# Patient Record
Sex: FEMALE | Race: ASIAN | NOT HISPANIC OR LATINO | Employment: OTHER | URBAN - METROPOLITAN AREA
[De-identification: names, ages, dates, MRNs, and addresses within clinical notes are randomized per-mention and may not be internally consistent; named-entity substitution may affect disease eponyms.]

---

## 2017-10-19 ENCOUNTER — LAB (OUTPATIENT)
Dept: LAB | Facility: HOSPITAL | Age: 57
End: 2017-10-19

## 2017-10-19 ENCOUNTER — OFFICE VISIT (OUTPATIENT)
Dept: GYNECOLOGIC ONCOLOGY | Facility: CLINIC | Age: 57
End: 2017-10-19

## 2017-10-19 VITALS
BODY MASS INDEX: 30.05 KG/M2 | RESPIRATION RATE: 14 BRPM | TEMPERATURE: 97 F | HEIGHT: 64 IN | OXYGEN SATURATION: 98 % | HEART RATE: 88 BPM | SYSTOLIC BLOOD PRESSURE: 146 MMHG | WEIGHT: 176 LBS | DIASTOLIC BLOOD PRESSURE: 71 MMHG

## 2017-10-19 DIAGNOSIS — E11.65 TYPE 2 DIABETES MELLITUS WITH HYPERGLYCEMIA, WITHOUT LONG-TERM CURRENT USE OF INSULIN (HCC): ICD-10-CM

## 2017-10-19 DIAGNOSIS — E66.09 CLASS 1 OBESITY DUE TO EXCESS CALORIES WITHOUT SERIOUS COMORBIDITY WITH BODY MASS INDEX (BMI) OF 30.0 TO 30.9 IN ADULT: ICD-10-CM

## 2017-10-19 DIAGNOSIS — N95.0 PMB (POSTMENOPAUSAL BLEEDING): ICD-10-CM

## 2017-10-19 DIAGNOSIS — E04.9 GOITER: Primary | ICD-10-CM

## 2017-10-19 DIAGNOSIS — Z65.9 SOCIAL PROBLEM: ICD-10-CM

## 2017-10-19 DIAGNOSIS — R93.89 ENDOMETRIAL THICKENING ON ULTRA SOUND: ICD-10-CM

## 2017-10-19 LAB
HBA1C MFR BLD: 8.1 % (ref 4.8–5.6)
TSH SERPL DL<=0.05 MIU/L-ACNC: 3.48 MIU/ML (ref 0.35–5.35)

## 2017-10-19 PROCEDURE — 84443 ASSAY THYROID STIM HORMONE: CPT | Performed by: OBSTETRICS & GYNECOLOGY

## 2017-10-19 PROCEDURE — 99244 OFF/OP CNSLTJ NEW/EST MOD 40: CPT | Performed by: OBSTETRICS & GYNECOLOGY

## 2017-10-19 PROCEDURE — 36415 COLL VENOUS BLD VENIPUNCTURE: CPT

## 2017-10-19 PROCEDURE — 83036 HEMOGLOBIN GLYCOSYLATED A1C: CPT | Performed by: OBSTETRICS & GYNECOLOGY

## 2017-10-19 NOTE — PROGRESS NOTES
Rochelle Hernandez  [id number]  1960      Reason for visit:  Fibroid uterus, PMB    Consultation:  Patient is being seen at the request of Dr. Massey for PMB, fibroid uterus    History of present illness:  The patient is a 57 y.o. year old female who presents today for treatment and evaluation of the above issues.    She notes menopause 6 years ago and denies postmenopausal bleeding until about 6 months ago when she developed spotting.    She was seen by Dr. Massey and underwent transvaginal ultrasound 2017.  Uterus was 5.3 x 6.6 x 5.2 cm with a 5.9 x 4.6 x 7.1 fibroid.  Ovaries were not seen.  Endometrial stripe was 24 mm.  There was no free fluid.     Pap smear 8/10/2017 was HR HPV and cytology negative.  That same day she underwent biopsy of a cervical polyp and pathology was benign.    Today, patient notes ongoing spotting.  She was scheduled for dilation curettage and declined.  She states that she lives alone in [facility] transportation issues for reasons not to get the D&C.  She states she is very busy trying to sell her house.  She notes that she does have friends that that they all work and she was concerned about finding transportation for procedure.  She has not had colonoscopy in the past.  Mammogram is up-to-date.  Patient has a history of thyroid dysfunction with no recent labs.  She's been on medication in the past.  She is newly established with a new primary care provider.    OBGYN History:  She is a .  She does not use HRT. She does not have a history of abnormal pap smears.      Oncologic History:   No history exists.         Past Medical History:   Diagnosis Date   • Borderline high blood pressure    • Positive TB test    • Thyroid disease        Past Surgical History:   Procedure Laterality Date   •  SECTION         MEDICATIONS: The current medication list was reviewed with the patient and updated in the EMR this date per the Medical Assistant. Medication dosages and frequencies  were confirmed to be accurate.      Allergies:  is allergic to latex.    Social History:   Social History     Social History   • Marital status:      Spouse name: N/A   • Number of children: 1   • Years of education: N/A     Occupational History   • Not on file.     Social History Main Topics   • Smoking status: Never Smoker   • Smokeless tobacco: Never Used   • Alcohol use No   • Drug use: No   • Sexual activity: Defer     Other Topics Concern   • Not on file     Social History Narrative   • No narrative on file       Family History:    Family History   Problem Relation Age of Onset   • No Known Problems Mother    • Emphysema Father    • No Known Problems Sister    • Alcohol abuse Brother    • No Known Problems Maternal Grandmother    • No Known Problems Maternal Grandfather    • No Known Problems Paternal Grandmother    • Throat cancer Paternal Grandfather      70       Health Maintenance:    Health Maintenance   Topic Date Due   • TDAP/TD VACCINES (1 - Tdap) 09/03/1980   • HEPATITIS C SCREENING  07/14/2017   • MAMMOGRAM  07/14/2017   • COLONOSCOPY  07/14/2017   • INFLUENZA VACCINE  08/01/2017   • PAP SMEAR  07/14/2017         Review of Systems   Constitutional: Negative for appetite change, chills, fatigue, fever and unexpected weight change.   HENT: Negative for congestion, hearing loss and sore throat.    Eyes: Negative for redness and visual disturbance.   Respiratory: Negative for cough, shortness of breath and wheezing.    Cardiovascular: Negative for chest pain, palpitations and leg swelling.   Gastrointestinal: Negative for abdominal distention, abdominal pain, blood in stool, constipation, diarrhea, nausea and vomiting.   Endocrine: Negative.  Negative for cold intolerance and heat intolerance.   Genitourinary: Negative for difficulty urinating, dyspareunia, dysuria, frequency, genital sores, hematuria, pelvic pain, urgency, vaginal bleeding, vaginal discharge and vaginal pain.   Musculoskeletal:  "Positive for gait problem. Negative for arthralgias, back pain and joint swelling.        Plantar fasciitis   Skin: Negative for rash and wound.   Allergic/Immunologic: Negative for food allergies and immunocompromised state.   Neurological: Negative for dizziness, seizures, syncope, weakness, light-headedness, numbness and headaches.   Hematological: Negative for adenopathy. Does not bruise/bleed easily.   Psychiatric/Behavioral: Negative.  Negative for confusion, dysphoric mood and sleep disturbance. The patient is not nervous/anxious.        Physical Exam    Vitals:    10/19/17 0937   BP: 146/71   Pulse: 88   Resp: 14   Temp: 97 °F (36.1 °C)   TempSrc: Temporal Artery    SpO2: 98%   Weight: 176 lb (79.8 kg)   Height: 64\" (162.6 cm)     Body mass index is 30.21 kg/(m^2).      GENERAL: Alert, well-appearing female appearing her stated age who is in no apparent distress.   HEENT: Sclera anicteric. Head normocephalic, atraumatic. Mucus membranes moist.   NECK: Trachea midline, supple, without masses. Goiter  BREASTS: Deferred  CARDIOVASCULAR: Normal rate, regular rhythm, no murmurs, rubs, or gallops.  No peripheral edema.  RESPIRATORY: Clear to auscultation bilaterally, normal respiratory effort  BACK:  No CVA tenderness, no vertebral tenderness on palpation  GASTROINTESTINAL:  Abdomen is soft, non-tender, non-distended, no rebound or guarding, no masses, or hernias. No HSM.   SKIN:  Warm, dry, well-perfused.  All visible areas intact.  No rashes, lesions, ulcers.  PSYCHIATRIC: AO x3, with appropriate affect, normal thought processes.  NEUROLOGIC: No focal deficits.  Moves extremities fairly well, very mild limp.  MUSCULOSKELETAL: Normal station, mild limp.  EXTREMITIES:   No cyanosis, clubbing, symmetric.  LYMPHATICS:  No cervical or inguinal adenopathy noted.     PELVIC exam:    GYNECOLOGIC:  External genitalia are free from lesion.  On speculum examination, the cervix was remarkable for a polyp at the cervical os. "  On bimanual examination no mass was appreciated.  Uterus was enlarged in size and shape. There is no cervical motion or uterine tenderness. No cervical mass was palpated. Parametria were smooth. Rectovaginal exam was deferred.  Exam was somewhat limited due to body habitus.    ECOG PS 0    PROCEDURES:  none    Diagnostic Data:    Pathology report, Pap smear report, ultrasound report, and provided notes were all reviewed  No results found for: ]      Assessment/Plan   This is a 57 y.o. woman with postmenopausal bleeding, thickened endometrial stripe, fibroid uterus.  Refused dilation and curettage with Dr. Massey.     I agree with  that patient needs a dilation curettage to exclude malignancy given postmenopausal bleeding and thickened endometrial stripe.  Cancer as a potential diagnosis was discussed with the patient.    She really needs primary care intervention.  TSH was ordered today due to goiter and history of prior medication for thyroid dysfunction.  She is hypertensive today with blood pressure in the 140s.  She is not taking medication for this and states that her blood pressures anywhere from 120s to 140s but typically runs 130s to 140s.  She was educated that she is likely hypertensive and would require medication.  She has never had a colonoscopy.  She thought typical breast cancer screening was a mammogram every 5 years.  She was educated regarding this.    Referral to social work as patient is having transportation problems for surgery.    Patient was consented for dilation curettage, hysteroscopy.      Risks and benefits of surgery were discussed.  This included, but was not limited to, infection and bleeding like when the skin is cut; damage to surrounding structures; and incisional complications.  Risk of DVT was addressed for major surgeries.  Standard of care efforts to minimize these risks were reviewed.  Typical recovery was discussed as well as post-procedure precautions.   Surgical implications of chronic illnesses on recovery and surgical outcome were reviewed.     Patient verbalized understanding of the plan including the risks and benefits.  Appropriate perioperative testing including laboratory evaluation, EKG as clinically indicated, chest x-ray as clinically indicated, and preadmission evaluation were all ordered as a part of this patient's care.    Patient was encouraged to see primary care physician to address ongoing primary care issues as noted above.    FOLLOW UP: Return for surgery.    Note: Speech recognition transcription software was used to dictate portions of this document.  An attempt at proofreading has been made though minor errors in transcription may still be present.  Please do not hesitate to call our office with any questions.      Electronically Signed by: Stephenie Davison MD  Date: 10/19/2017         Hemoglobin A1c was elevated.  Metformin was prescribed.  Patient was contacted by APRN to discuss ongoing follow-up with primary care provider of newly diagnosed diabetes.    Stephenie Davison MD  10/19/17  1:11 PM

## 2017-10-20 ENCOUNTER — TELEPHONE (OUTPATIENT)
Dept: OBSTETRICS AND GYNECOLOGY | Facility: CLINIC | Age: 57
End: 2017-10-20

## 2017-10-20 NOTE — TELEPHONE ENCOUNTER
----- Message from Stephenie Davison MD sent at 10/19/2017  4:25 PM EDT -----  Please call pt re DM as we discussed  Thanks-    ----- Message -----     From: Lab, Background User     Sent: 10/19/2017  10:14 AM       To: Stephenie Davison MD

## 2017-10-25 ENCOUNTER — TELEPHONE (OUTPATIENT)
Dept: GYNECOLOGIC ONCOLOGY | Facility: CLINIC | Age: 57
End: 2017-10-25

## 2017-10-25 NOTE — TELEPHONE ENCOUNTER
Returned pt's call regarding scheduling outpatient surgery with Dr. Davison.  Left message to call.

## 2017-10-26 NOTE — TELEPHONE ENCOUNTER
I called patient to discuss her recent lab results. Notified HgbA1C = 8.10.   We discussed what this lab means and correlation with blood sugar control. She states she has never been diagnosed with diabetes. I informed her that based upon this lab value, this is likely the case, but it should be followed up with her new PCP. Dr. Davison has sent Metformin to her pharmacy for her to begin now. We reviewed medication instructions, risks, benefits, and potential side effects.     She was encouraged to cut out sugars and simple carbohydrates from her diet. She states this may be difficult as pastas and breads are a large portion of her current diet. We discussed how diabetes can impair the body's ability to heal after surgery. It is very important that she make these diet changes and begin her new medication in anticipation of her surgery with Dr. Davison. Education also provided on long term effects of poor blood sugar control including damage to eye sight, neuropathy, etc. I encouraged Rochelle to make an appointment with her PCP to discuss her elevated labs and ongoing management. I will send them a letter to notify them as well. She v/u.

## 2017-10-31 ENCOUNTER — PREP FOR SURGERY (OUTPATIENT)
Dept: GYNECOLOGIC ONCOLOGY | Facility: CLINIC | Age: 57
End: 2017-10-31

## 2017-10-31 DIAGNOSIS — N95.0 PMB (POSTMENOPAUSAL BLEEDING): Primary | ICD-10-CM

## 2017-11-03 ENCOUNTER — PREP FOR SURGERY (OUTPATIENT)
Dept: OBSTETRICS AND GYNECOLOGY | Facility: CLINIC | Age: 57
End: 2017-11-03

## 2017-11-03 NOTE — PATIENT INSTRUCTIONS
Outpatient Pre-op Patient Education  *See checked boxes for your instructions*    Rochelle Hernandez  [id number]  1960    SURGEON: Dr. Davison    Appointment  [x]  1. Your surgery has been scheduled on 11-20-17 at [facility], [facility] located at [address]. You will need to be there at 7:00 AM.   [x] 2.  You have pre-admission testing (PAT) appointment on 11-19-17 at 3:15 PM.  You will need to be at hospital registration 10 minutes before that time.     [x] 3.  The registration department is located in the long hallway between the [address].       The Day(s) Before Surgery  [x] 1.  Do not drink alcohol or smoke.     [x] 2.  Do not take vitamins or aspirin one week before surgery.       [x] 3.  If you are ill on the days leading up to your surgery, please call our office.      [x] 4.  If you are using medications for diabetes, call the physician who manages these and get instructions on how they should be taken before and after surgery.    [x] 5.  Nothing to eat or drink after midnight on 11-19-17.      [x] 6.  Please make prior arrangements for someone to drive you home after your procedure        The Day of Surgery  [x] 1.  Do not eat, drink, or chew gum.     [x] 2.  On the morning of your surgery, you may take her prescription medications with a sip of water. Bring all medication with you to the surgery center. (Diabetic patients should bring insulin if instructed to do so by their diabetes managing physician).   [x] 3. Bathe or shower the morning of your surgery. Do not use powders, lotions, or creams. Deodorants are okay.     [x] 4. Wear loose, comfortable clothing.     [x] 5. Bring holders for glasses, contacts, or dentures.      [x] 6. Bring any required payment and forms, including insurance cards. Leave all money and valuables at home.        Post-surgery Instructions  [x] 1.  For the first 24 hours, rest and take periodic deep breaths to remove anesthetic agents from  your body.   [x] 2.  Follow any specific instructions relevant to your particular surgery.     [x] 3.  Limit activity to avoid stress to the surgical site.      [x] 4.  Keep all dressings dry. Shower or bathe as instructed by your doctor.     [x] 5.  Drink and eat light foods. Remain on liquids alone only if nausea and vomiting occur. Return to your regular diet gradually, as tolerance allows.    [x] 6. Avoid alcohol for at least 24 hours.      [x] 7.  Take prescription pain medication as directed and with food.      [x] 8.  Call our office after discharge from the surgery center to make your post-op appointment.        In Case of Emergency:  Call our office if you experience any of the following:  - Excessive drainage, bleeding, swelling, or redness at the incision site  - Severe pain not eased by pain medication  - Temperature above 101  - Persistent nausea or vomiting  - Skin rash or general body itching

## 2017-11-05 ENCOUNTER — RESULTS ENCOUNTER (OUTPATIENT)
Dept: GYNECOLOGIC ONCOLOGY | Facility: CLINIC | Age: 57
End: 2017-11-05

## 2017-11-05 DIAGNOSIS — N95.0 PMB (POSTMENOPAUSAL BLEEDING): ICD-10-CM

## 2017-11-06 ENCOUNTER — TELEPHONE (OUTPATIENT)
Dept: GYNECOLOGIC ONCOLOGY | Facility: CLINIC | Age: 57
End: 2017-11-06

## 2017-11-06 NOTE — TELEPHONE ENCOUNTER
Phoned pt per Dr. Davison's request, asked her if she had an appt. to see her PCP yet.  Pt says she has not, she has not had time.  Informed pt Dr. Davison has to have a note from her PCP before she can go into the OR.  Pt v/u, will call now to get appt., call me back to let me know it will be.   Received call from [facility] with Dr. Carrizales, states pt has an appt on 11-14-17, will copy the notes of the visit and fax to Dr. Davison.

## 2017-11-19 ENCOUNTER — APPOINTMENT (OUTPATIENT)
Dept: PREADMISSION TESTING | Facility: HOSPITAL | Age: 57
End: 2017-11-19

## 2017-11-19 DIAGNOSIS — N95.0 PMB (POSTMENOPAUSAL BLEEDING): ICD-10-CM

## 2017-11-19 LAB
ABO GROUP BLD: NORMAL
ALBUMIN SERPL-MCNC: 4.5 G/DL (ref 3.2–4.8)
ALBUMIN/GLOB SERPL: 1.6 G/DL (ref 1.5–2.5)
ALP SERPL-CCNC: 90 U/L (ref 25–100)
ALT SERPL W P-5'-P-CCNC: 36 U/L (ref 7–40)
ANION GAP SERPL CALCULATED.3IONS-SCNC: 7 MMOL/L (ref 3–11)
AST SERPL-CCNC: 25 U/L (ref 0–33)
BASOPHILS # BLD AUTO: 0.02 10*3/MM3 (ref 0–0.2)
BASOPHILS NFR BLD AUTO: 0.2 % (ref 0–1)
BILIRUB SERPL-MCNC: 0.4 MG/DL (ref 0.3–1.2)
BLD GP AB SCN SERPL QL: NEGATIVE
BUN BLD-MCNC: 22 MG/DL (ref 9–23)
BUN/CREAT SERPL: 27.5 (ref 7–25)
CALCIUM SPEC-SCNC: 9.6 MG/DL (ref 8.7–10.4)
CHLORIDE SERPL-SCNC: 104 MMOL/L (ref 99–109)
CO2 SERPL-SCNC: 30 MMOL/L (ref 20–31)
CREAT BLD-MCNC: 0.8 MG/DL (ref 0.6–1.3)
DEPRECATED RDW RBC AUTO: 35.9 FL (ref 37–54)
EOSINOPHIL # BLD AUTO: 0.06 10*3/MM3 (ref 0–0.3)
EOSINOPHIL NFR BLD AUTO: 0.7 % (ref 0–3)
ERYTHROCYTE [DISTWIDTH] IN BLOOD BY AUTOMATED COUNT: 12.5 % (ref 11.3–14.5)
GFR SERPL CREATININE-BSD FRML MDRD: 74 ML/MIN/1.73
GFR SERPL CREATININE-BSD FRML MDRD: 90 ML/MIN/1.73
GLOBULIN UR ELPH-MCNC: 2.9 GM/DL
GLUCOSE BLD-MCNC: 149 MG/DL (ref 70–100)
HCT VFR BLD AUTO: 37.9 % (ref 34.5–44)
HGB BLD-MCNC: 12.5 G/DL (ref 11.5–15.5)
IMM GRANULOCYTES # BLD: 0.02 10*3/MM3 (ref 0–0.03)
IMM GRANULOCYTES NFR BLD: 0.2 % (ref 0–0.6)
LYMPHOCYTES # BLD AUTO: 3.19 10*3/MM3 (ref 0.6–4.8)
LYMPHOCYTES NFR BLD AUTO: 34.7 % (ref 24–44)
MCH RBC QN AUTO: 25.9 PG (ref 27–31)
MCHC RBC AUTO-ENTMCNC: 33 G/DL (ref 32–36)
MCV RBC AUTO: 78.5 FL (ref 80–99)
MONOCYTES # BLD AUTO: 0.49 10*3/MM3 (ref 0–1)
MONOCYTES NFR BLD AUTO: 5.3 % (ref 0–12)
NEUTROPHILS # BLD AUTO: 5.4 10*3/MM3 (ref 1.5–8.3)
NEUTROPHILS NFR BLD AUTO: 58.9 % (ref 41–71)
PLATELET # BLD AUTO: 310 10*3/MM3 (ref 150–450)
PMV BLD AUTO: 9.7 FL (ref 6–12)
POTASSIUM BLD-SCNC: 4 MMOL/L (ref 3.5–5.5)
PROT SERPL-MCNC: 7.4 G/DL (ref 5.7–8.2)
RBC # BLD AUTO: 4.83 10*6/MM3 (ref 3.89–5.14)
RH BLD: POSITIVE
SODIUM BLD-SCNC: 141 MMOL/L (ref 132–146)
WBC NRBC COR # BLD: 9.18 10*3/MM3 (ref 3.5–10.8)

## 2017-11-19 PROCEDURE — 80053 COMPREHEN METABOLIC PANEL: CPT | Performed by: OBSTETRICS & GYNECOLOGY

## 2017-11-19 PROCEDURE — 36415 COLL VENOUS BLD VENIPUNCTURE: CPT

## 2017-11-19 PROCEDURE — 85025 COMPLETE CBC W/AUTO DIFF WBC: CPT | Performed by: OBSTETRICS & GYNECOLOGY

## 2017-11-19 PROCEDURE — 86900 BLOOD TYPING SEROLOGIC ABO: CPT | Performed by: OBSTETRICS & GYNECOLOGY

## 2017-11-19 PROCEDURE — 86901 BLOOD TYPING SEROLOGIC RH(D): CPT | Performed by: OBSTETRICS & GYNECOLOGY

## 2017-11-19 PROCEDURE — 86850 RBC ANTIBODY SCREEN: CPT | Performed by: OBSTETRICS & GYNECOLOGY

## 2017-11-19 PROCEDURE — 93010 ELECTROCARDIOGRAM REPORT: CPT | Performed by: INTERNAL MEDICINE

## 2017-11-19 PROCEDURE — 93005 ELECTROCARDIOGRAM TRACING: CPT

## 2017-11-20 ENCOUNTER — LAB REQUISITION (OUTPATIENT)
Dept: LAB | Facility: HOSPITAL | Age: 57
End: 2017-11-20

## 2017-11-20 ENCOUNTER — OUTSIDE FACILITY SERVICE (OUTPATIENT)
Dept: GYNECOLOGIC ONCOLOGY | Facility: CLINIC | Age: 57
End: 2017-11-20

## 2017-11-20 DIAGNOSIS — Z01.89 LABORATORY TEST: Primary | ICD-10-CM

## 2017-11-20 DIAGNOSIS — N95.0 POSTMENOPAUSAL BLEEDING: ICD-10-CM

## 2017-11-20 PROCEDURE — 88305 TISSUE EXAM BY PATHOLOGIST: CPT | Performed by: OBSTETRICS & GYNECOLOGY

## 2017-11-20 PROCEDURE — 58563 HYSTEROSCOPY ABLATION: CPT | Performed by: OBSTETRICS & GYNECOLOGY

## 2017-11-20 PROCEDURE — 58120 DILATION AND CURETTAGE: CPT | Performed by: OBSTETRICS & GYNECOLOGY

## 2017-11-21 ENCOUNTER — TELEPHONE (OUTPATIENT)
Dept: GYNECOLOGIC ONCOLOGY | Facility: CLINIC | Age: 57
End: 2017-11-21

## 2017-11-21 LAB
CYTO UR: NORMAL
LAB AP CASE REPORT: NORMAL
LAB AP CLINICAL INFORMATION: NORMAL
Lab: NORMAL
PATH REPORT.FINAL DX SPEC: NORMAL
PATH REPORT.GROSS SPEC: NORMAL

## 2017-11-21 NOTE — TELEPHONE ENCOUNTER
----- Message from Danni Bennett sent at 11/21/2017  1:36 PM EST -----  Regarding: post op -juanita  Contact: [phone]  Patient phoned c/o post op concerns.   Phoned pt, states had an episode with moderate amount of bleeding but says it is much better at present.  She has no other c/o.  Instructed her to monitor, call if saturating a pad every 1 hour or less, o/w, Dr. Davison will call her with pathology.  PT v/u, will call if needed, o/w await call from Dr. Davison.

## 2017-11-24 ENCOUNTER — TELEPHONE (OUTPATIENT)
Dept: GYNECOLOGIC ONCOLOGY | Facility: CLINIC | Age: 57
End: 2017-11-24

## 2017-11-24 NOTE — TELEPHONE ENCOUNTER
"GYN Oncology    Telephone Call    Patient calling with complaints of fatigue and feeling \"groggy.\"  Feels fatigue is worsening over the past few days.  No VB or discharge.  No abdominal pain.  No fever.  No CP/SOB.  Nml appetite.  Nml bowel and bladder function.  Denies sick contacts.  No cough or rhinorrhea.  No HA or visual change.  States she felt recovered from the D+C as of several days ago.    No medication changes.  States she has not taken her metformin for several days.  Does not have a meter to check her blood sugar.    So far today has stayed at home and made breakfast and lunch for herself.  Feels in general that she is sleeping well.    Does not feel like her fatigue is emergent or she needs to go to the ED.  Pleasant on the phone, sounding well, and talking in full paragraphs.    Given that she does not have any GYN complaints, I gave basic precautions and also advised that she could touch base with her PCP office if sx continue/worsen/become more focal.    Araceli Briggs MD  11/24/17  3:04 PM    "

## 2017-11-27 ENCOUNTER — TELEPHONE (OUTPATIENT)
Dept: GYNECOLOGIC ONCOLOGY | Facility: CLINIC | Age: 57
End: 2017-11-27

## 2017-11-27 NOTE — TELEPHONE ENCOUNTER
Returned pt's call regarding pathology results.  Left message with benign results, instructed to call if needed.  Spoke with patient, informed her of results being negative.  Pt asked about how long the effects of anesthesia would last. Told her they would be out of her system by now.  States Dr. Briggs told her to go to the [facility].  States will go if no better in the next couple of days.  Instructed her to call if needed.

## 2017-11-30 ENCOUNTER — OFFICE VISIT (OUTPATIENT)
Dept: GYNECOLOGIC ONCOLOGY | Facility: CLINIC | Age: 57
End: 2017-11-30

## 2017-11-30 VITALS
OXYGEN SATURATION: 98 % | DIASTOLIC BLOOD PRESSURE: 74 MMHG | WEIGHT: 171 LBS | BODY MASS INDEX: 29.35 KG/M2 | RESPIRATION RATE: 16 BRPM | TEMPERATURE: 97 F | HEART RATE: 108 BPM | SYSTOLIC BLOOD PRESSURE: 155 MMHG

## 2017-11-30 DIAGNOSIS — Z98.890 POST-OPERATIVE STATE: Primary | ICD-10-CM

## 2017-11-30 PROCEDURE — 99024 POSTOP FOLLOW-UP VISIT: CPT | Performed by: OBSTETRICS & GYNECOLOGY

## 2017-12-02 PROBLEM — Z98.890 POST-OPERATIVE STATE: Status: ACTIVE | Noted: 2017-12-02

## 2017-12-02 NOTE — PROGRESS NOTES
Rochelle Hernandez  [id number]  1960      Reason for Visit:  Postoperative evaluation    History of Present Illness:  Patient is a very pleasant 57 y.o. woman who presents for a post operative evaluation status post dilation and curettage, hysteroscopy performed on 11/20/2017.  Surgery and hospital course were uncomplicated.  Today, patient notes normal bowel and bladder function.  Her pain is well controlled. She has questions about resuming normal activities. She is having occasional spotting.    Past Medical History, Past Surgical History, Social History, Family History have been reviewed and are without significant changes except as mentioned.    Review of Systems   A comprehensive 12 point review of systems was performed and was negative except as mentioned.    Medications:  The current medication list was reviewed in the EMR    ALLERGIES:    Allergies   Allergen Reactions   • Latex Rash     Sweating, red           /74  Pulse 108  Temp 97 °F (36.1 °C) (Temporal Artery )   Resp 16  Wt 171 lb (77.6 kg)  SpO2 98%  BMI 29.35 kg/m2       Physical Exam  Constitutional:  Patient is a pleasant woman in no acute distress.  Musculoskeletal:  ambulating well, moving all extremities well  Exam otherwise deferred.    PATHOLOGY:  Final Diagnosis    ENDOMETRIAL CURETTINGS:  Fragments of benign endometrial polyps without atypia.         ASSESSMENT/PLAN:  Rochelle Hernandez returns for a post-operative evaluation today.  All pathology reports were given to patient.  This includes labs.      Overall, the patient is very pleased with her care.  I recommended continuation of post operative precautions as discussed.    She was wondering if she need to continue metformin for newly diagnosed DM.  She was encouraged to do this and have PCP manage her DM.     She is to Return for on an as-needed basis.    Note: Speech recognition transcription software was used to dictate portions of this document.  An attempt at proofreading has  been made though minor errors in transcription may still be present.  Please do not hesitate to call our office with any questions.    Stephenie Davison MD